# Patient Record
(demographics unavailable — no encounter records)

---

## 2025-02-03 NOTE — PHYSICAL EXAM
[de-identified] : Left wrist Mild tenderness palpation of the distal radius physis.  There is no deformity.  There is no swelling.  He is able to make a full composite fist.  No skin openings or abrasions.  Denies numbness or tingling.  No tenderness over the scaphoid tubercle.  No tenderness over the anatomical snuffbox.  He is able to make a full composite fist.  Full range of motion of the elbow and shoulder that is painless. [de-identified] : 4 view x-ray left wrist including AP, lateral, oblique, navicular view were taken today and personally viewed, there is no fracture of the scaphoid.  There is a distal radius buckle fracture/Salter-David II nondisplaced fracture.  Prior x-rays from urgent care 1-25 were reviewed again today, there is a buckle fracture of the distal radius, near the physis distally dorsally unchanged from x-rays today.

## 2025-02-03 NOTE — ASSESSMENT
[FreeTextEntry1] : 14-year-old male, he has approximately 1 week status post fall with left distal radius fracture.  -Fracture is nondisplaced and has a buckle component on x-ray today.  I discussed with his mother today plan going forward.  Discussed the importance of remaining nonweightbearing left upper extremity.  He is homeschooled.  He does play the piano I recommend he encourage digit range of motion but should avoid any heavy lifting.  We discussed the role of casting versus bracing today.  Given the nondisplacement.  It has not displaced over the past week and he has been taking off the splint that he was given in the ER do not feel strongly that he needs a cast and his mother believes that he can be compliant wearing the brace 24/7 other than to shower.  We discussed that this is involves the physis and does require immobilization for this reason. -Follow-up in 2 weeks, repeat x-rays of the left wrist at the time.

## 2025-02-03 NOTE — HISTORY OF PRESENT ILLNESS
Patient called stating it was urgent she speak to Darlin. Her insurance joe is expiring on Saturday and she said she cannot live without this medication.     Patient was reluctant to give anymore info.     I messaged Darlin and got the ok to transfer the call to her.    [FreeTextEntry1] : 14-year-old male presenting for evaluation left wrist pain.  He stained a fall on outstretched hand while ice-skating this is approximate 1 week ago.  He went to the ER at the time where there was concern for either a buckle fracture or a scaphoid fracture.  He presents today with continued left wrist pain however is slightly improved since the event.  Points to the distal radius as a source of pain.  His swelling has improved.  He is here with his mother today who acts as his historian due to the patient's age

## 2025-02-13 NOTE — PHYSICAL EXAM
[de-identified] : Left wrist Very minimal tenderness palpation of the distal radius physis.  There is no deformity.  There is no swelling.  He is able to make a full composite fist.  No skin openings or abrasions.  Denies numbness or tingling.  No tenderness over the scaphoid tubercle.  No tenderness over the anatomical snuffbox.  He is able to make a full composite fist.  Full range of motion of the elbow and shoulder that is painless. [de-identified] : 3view x-ray left wrist including AP, lateral, obliquewere taken today and personally viewed, these demonstrate a healing distal radius fracture, skeletally immature patient, no significant physeal disruption  Pr

## 2025-02-13 NOTE — HISTORY OF PRESENT ILLNESS
[FreeTextEntry1] : 14-year-old male here for follow-up today left wrist buckle fracture.  Since last being seen he has been wearing his brace he is going at near full-time there approximately 3 weeks out from the initial injury at this time..  He is having very minimal pain.  He is been playing PNO with the brace on.  He is here with his mother today who acts as his historian due to the patient's age.

## 2025-02-13 NOTE — ASSESSMENT
[FreeTextEntry1] : 14-year-old male, he has approximately 3 weeks with left distal radius incomplete fracture  -Fracture is nondisplaced and has a buckle component on x-ray today.  I discussed with his mother today plan going forward.  Discussed the importance of remaining nonweightbearing left upper extremity.  He is homeschooled.   -He has been compliant with the wrist brace, he is having minimal pain on examination today.  Given he is only 3 weeks out from the injury I would continue wearing the brace full-time for another week and then to slowly wean out of this.  He may take off for rest and hygiene.  Over the next 2 to 3 weeks they should wean out of the brace to use only for heavy activity we discussed the fracture is healing however is not healed yet and the importance of avoiding reaggravation or reinjury -She remain out of sports, he may play piano with his brace on any sort of lifting should be avoided. -His mother and his agreement and understanding the plan -Follow-up in -3  weeks, repeat x-rays of the left wrist at the time.

## 2025-03-10 NOTE — ASSESSMENT
[FreeTextEntry1] : 14-year-old male, he has approximately 6 weeks with left distal radius incomplete fracture  -Fracture is nondisplaced and has a buckle component on x-ray today.  I discussed with his mother today plan going forward.  At this time he is 6 weeks out from the injury.  He may advance activity as tolerated.  They have slowly weaned out of the brace and he has began playing piano again.   -Discussed he should continue to improve over the next few weeks.  He may advance activity as tolerated but he should begin lightly with the goal of unrestricted activity at 1 month from now.  He is homeschooled.   -His mother and his agreement and understanding the plan -Follow-up in 4 to 6 weeks if pain persists or worsens

## 2025-03-10 NOTE — PHYSICAL EXAM
[de-identified] : Left wrist No l tenderness palpation of the distal radius physis.  There is no deformity.  Palpable callus dorsally. there is no swelling.  He is able to make a full composite fist.  No skin openings or abrasions.  Denies numbness or tingling.  No tenderness over the scaphoid tubercle.  No tenderness over the anatomical snuffbox.  He is able to make a full composite fist.  Full range of motion of the elbow and shoulder that is painless. [de-identified] : 3view x-ray left wrist including AP, lateral, obliquewere taken today and personally viewed, these demonstrate a healed distal radius fracture, skeletally immature patient, no significant physeal disruption

## 2025-03-10 NOTE — HISTORY OF PRESENT ILLNESS
[FreeTextEntry1] : 14-year-old male here for follow-up.  He is now 6 weeks from initial injury date.  He is having no pain at this time other than occasional dorsal hand pain as he has been retrying piano.  He is slowly weaned out of the brace.  He is here with his mother today who acts as his historian